# Patient Record
Sex: FEMALE | Race: WHITE | NOT HISPANIC OR LATINO | URBAN - METROPOLITAN AREA
[De-identification: names, ages, dates, MRNs, and addresses within clinical notes are randomized per-mention and may not be internally consistent; named-entity substitution may affect disease eponyms.]

---

## 2018-01-08 ENCOUNTER — OFFICE VISIT - HEALTHEAST (OUTPATIENT)
Dept: FAMILY MEDICINE | Facility: CLINIC | Age: 58
End: 2018-01-08

## 2018-01-08 ENCOUNTER — COMMUNICATION - HEALTHEAST (OUTPATIENT)
Dept: TELEHEALTH | Facility: CLINIC | Age: 58
End: 2018-01-08

## 2018-01-08 DIAGNOSIS — K64.4 EXTERNAL HEMORRHOIDS: ICD-10-CM

## 2018-01-08 DIAGNOSIS — Z13.1 SCREENING FOR DIABETES MELLITUS: ICD-10-CM

## 2018-01-08 DIAGNOSIS — I10 ESSENTIAL HYPERTENSION: ICD-10-CM

## 2018-01-08 DIAGNOSIS — Z00.00 ROUTINE GENERAL MEDICAL EXAMINATION AT A HEALTH CARE FACILITY: ICD-10-CM

## 2018-01-08 DIAGNOSIS — Z13.220 SCREENING FOR LIPID DISORDERS: ICD-10-CM

## 2018-01-08 DIAGNOSIS — Z12.4 SCREENING FOR CERVICAL CANCER: ICD-10-CM

## 2018-01-08 LAB
CHOLEST SERPL-MCNC: 239 MG/DL
FASTING STATUS PATIENT QL REPORTED: YES
FASTING STATUS PATIENT QL REPORTED: YES
GLUCOSE BLD-MCNC: 93 MG/DL (ref 70–125)
HDLC SERPL-MCNC: 50 MG/DL
LDLC SERPL CALC-MCNC: 175 MG/DL
TRIGL SERPL-MCNC: 70 MG/DL

## 2018-01-08 RX ORDER — MULTIVIT-MIN/IRON FUM/FOLIC AC 7.5 MG-4
TABLET ORAL
Status: SHIPPED | COMMUNITY
Start: 2018-01-08

## 2018-01-08 ASSESSMENT — MIFFLIN-ST. JEOR: SCORE: 1447.45

## 2018-01-08 NOTE — ASSESSMENT & PLAN NOTE
The patient was diagnosed with hypertension based on one office visit and prescribed 10 mg of amlodipine several years ago.  She never started the medication and has been checking her blood pressure.  She says in general is between the 1 teens in the 130s.  She is a strong preference not to be on antihypertensive medications.  She is in an acceptable range.  We did discuss that the recommendations are in flux and may be changing and that I might recommend an antihypertensive medication next year if she continues to be in the similar range.

## 2018-01-08 NOTE — ASSESSMENT & PLAN NOTE
57-year-old woman establishing care with wellness exam today.  No specific complaints.  -It has been apparently 7 years since her most recent lipid profile.  Check fasting lipids today.  -The patient is overweight.  Screen for diabetes.  -Pap smear performed today.  Anticipate normalresult.  (Status post partial hysterectomy with intact cervix).  -Reviewed colonoscopy history.  She is due in 2021.  We have requested records.  -Mammography ordered

## 2018-01-09 LAB
HPV SOURCE: NORMAL
HUMAN PAPILLOMA VIRUS 16 DNA: NEGATIVE
HUMAN PAPILLOMA VIRUS 18 DNA: NEGATIVE
HUMAN PAPILLOMA VIRUS FINAL DIAGNOSIS: NORMAL
HUMAN PAPILLOMA VIRUS OTHER HR: NEGATIVE
SPECIMEN DESCRIPTION: NORMAL

## 2018-01-11 LAB
BKR LAB AP ABNORMAL BLEEDING: NO
BKR LAB AP BIRTH CONTROL/HORMONES: NORMAL
BKR LAB AP CERVICAL APPEARANCE: NORMAL
BKR LAB AP GYN ADEQUACY: NORMAL
BKR LAB AP GYN INTERPRETATION: NORMAL
BKR LAB AP HPV REFLEX: NORMAL
BKR LAB AP LMP: NORMAL
BKR LAB AP PATIENT STATUS: NORMAL
BKR LAB AP PREVIOUS ABNORMAL: 1987
BKR LAB AP PREVIOUS NORMAL: 2013
HIGH RISK?: NO
PATH REPORT.COMMENTS IMP SPEC: NORMAL
RESULT FLAG (HE HISTORICAL CONVERSION): NORMAL

## 2018-01-17 ENCOUNTER — COMMUNICATION - HEALTHEAST (OUTPATIENT)
Dept: FAMILY MEDICINE | Facility: CLINIC | Age: 58
End: 2018-01-17

## 2018-01-17 DIAGNOSIS — Z91.89 AT HIGH RISK FOR BREAST CANCER: ICD-10-CM

## 2018-01-17 DIAGNOSIS — Z12.31 VISIT FOR SCREENING MAMMOGRAM: ICD-10-CM

## 2018-01-17 DIAGNOSIS — Z12.39 SCREENING FOR BREAST CANCER: ICD-10-CM

## 2018-02-08 ENCOUNTER — HOSPITAL ENCOUNTER (OUTPATIENT)
Dept: MAMMOGRAPHY | Facility: CLINIC | Age: 58
Discharge: HOME | End: 2018-02-08
Attending: FAMILY MEDICINE

## 2018-02-08 DIAGNOSIS — Z12.31 VISIT FOR SCREENING MAMMOGRAM: ICD-10-CM

## 2018-02-08 DIAGNOSIS — Z91.89 AT HIGH RISK FOR BREAST CANCER: ICD-10-CM

## 2018-02-08 DIAGNOSIS — Z12.39 SCREENING FOR BREAST CANCER: ICD-10-CM

## 2021-05-31 VITALS — BODY MASS INDEX: 29.51 KG/M2 | WEIGHT: 188 LBS | HEIGHT: 67 IN

## 2021-06-15 NOTE — PROGRESS NOTES
Assessment/Plan:      Healthy female exam.      Routine general medical examination at a health care facility  57-year-old woman establishing care with wellness exam today.  No specific complaints.  -It has been apparently 7 years since her most recent lipid profile.  Check fasting lipids today.  -The patient is overweight.  Screen for diabetes.  -Pap smear performed today.  Anticipate normal result.  (Status post partial hysterectomy with intact cervix).  -Reviewed colonoscopy history.  She is due in 2021.  We have requested records.  -Mammography ordered    Essential hypertension  The patient was diagnosed with hypertension based on one office visit and prescribed 10 mg of amlodipine several years ago.  She never started the medication and has been checking her blood pressure.  She says in general is between the 1 teens in the 130s.  She is a strong preference not to be on antihypertensive medications.  She is in an acceptable range.  We did discuss that the recommendations are in flux and may be changing and that I might recommend an antihypertensive medication next year if she continues to be in the similar range.    Subjective:      Miryam Botello is a 57 y.o. female who presents for an annual exam. The patient is sexually active. The patient participates in regular exercise: yes. The patient reports that there is not domestic violence in her life.     Healthy Habits:   Regular Exercise: Yes, kettle bells  Sunscreen Use: Yes  Healthy Diet: Yes  Dental Visits Regularly: Yes  Seat Belt: Yes  Sexually active: Yes  Self Breast Exam Monthly:Yes  Hemoccults: N/A  Flex Sig: N/A  Colonoscopy: Yes  Lipid Profile: Yes  Glucose Screen: Yes  Prevention of Osteoporosis: No  Last Dexa: N/A  Guns at Home:  No      Immunization History   Administered Date(s) Administered     Tdap 01/13/2009     Immunization status: up to date and documented.    No exam data present    Gynecologic History  No LMP recorded. Patient has had a  hysterectomy.  Contraception: none  Last Pap: 2013. Results were: normal  Last mammogram: 2015. Results were: normal      OB History   No data available       Current Outpatient Prescriptions   Medication Sig Dispense Refill     cholecalciferol, vitamin D3, 1,000 unit tablet 5,000 Int'l Units.       magnesium sulfate 100 mg cap 235 mg.       multivitamin with minerals tablet        NON FORMULARY probodic- 1 tablet daily       No current facility-administered medications for this visit.      History reviewed. No pertinent past medical history.  Past Surgical History:   Procedure Laterality Date     BUNIONECTOMY      2000 left foot      HYSTERECTOMY      pt still has cervix      WISDOM TOOTH EXTRACTION       Codeine  Family History   Problem Relation Age of Onset     Stroke Mother      Obesity Mother      Cancer Mother      lung     Hypertension Mother      Rheum arthritis Father      Hypertension Father      Diabetes Father      Cancer Sister      lung cancer      No Medical Problems Brother      Stroke Maternal Grandmother      Emphysema Maternal Grandmother      Cancer Paternal Grandmother      lymphoma      Prostate cancer Paternal Grandfather      Colon cancer Neg Hx      Social History     Social History     Marital status:      Spouse name: N/A     Number of children: N/A     Years of education: N/A     Occupational History     Not on file.     Social History Main Topics     Smoking status: Never Smoker     Smokeless tobacco: Never Used     Alcohol use Yes      Comment: 4 glasses of wine      Drug use: No     Sexual activity: Yes     Other Topics Concern     Not on file     Social History Narrative     No narrative on file       Review of Systems  Review of Systems   Constitutional: Negative.    HENT: Negative.    Eyes: Negative.    Respiratory: Negative.    Cardiovascular: Negative.    Gastrointestinal: Negative.    Endocrine: Negative.    Genitourinary: Negative.    Musculoskeletal: Negative.    Skin:  "Negative.    Neurological: Negative.    Hematological: Negative.    Psychiatric/Behavioral: Negative.              Objective:         Vitals:    01/08/18 0945   BP: 136/86   Pulse: 86   Resp: 20   Temp: 98.3  F (36.8  C)   TempSrc: Oral   SpO2: 98%   Weight: 188 lb (85.3 kg)   Height: 5' 6.5\" (1.689 m)     Body mass index is 29.89 kg/(m^2).    Physical  Physical Exam   Constitutional: She is oriented to person, place, and time and well-developed, well-nourished, and in no distress. No distress.   HENT:   Head: Normocephalic.   Right Ear: External ear normal.   Left Ear: External ear normal.   Mouth/Throat: Oropharynx is clear and moist. No oropharyngeal exudate.   Eyes: Conjunctivae and EOM are normal. Pupils are equal, round, and reactive to light. Left eye exhibits no discharge. No scleral icterus.   Neck: Normal range of motion. No thyromegaly present.   Cardiovascular: Normal rate, regular rhythm and intact distal pulses.  Exam reveals no gallop and no friction rub.    No murmur heard.  Pulmonary/Chest: Effort normal and breath sounds normal. No respiratory distress.   Abdominal: Soft. She exhibits no distension and no mass. There is no tenderness.   Genitourinary: Rectal exam shows external hemorrhoid. There is no rash, tenderness or lesion on the right labia. There is no rash, tenderness or lesion on the left labia. Cervix exhibits no motion tenderness, no discharge and no friability. No bleeding in the vagina. No vaginal discharge found.   Musculoskeletal: Normal range of motion. She exhibits no edema.   Lymphadenopathy:     She has no cervical adenopathy.   Neurological: She is alert and oriented to person, place, and time. She displays normal reflexes. No cranial nerve deficit.   Skin: Skin is warm and dry.   Psychiatric: Mood, memory, affect and judgment normal.   Nursing note and vitals reviewed.         "

## 2021-06-16 PROBLEM — K64.4 EXTERNAL HEMORRHOIDS: Status: ACTIVE | Noted: 2018-01-09

## 2021-06-16 PROBLEM — Z00.00 ROUTINE GENERAL MEDICAL EXAMINATION AT A HEALTH CARE FACILITY: Status: ACTIVE | Noted: 2018-01-08

## 2021-06-26 ENCOUNTER — HEALTH MAINTENANCE LETTER (OUTPATIENT)
Age: 61
End: 2021-06-26

## 2021-10-16 ENCOUNTER — HEALTH MAINTENANCE LETTER (OUTPATIENT)
Age: 61
End: 2021-10-16

## 2022-07-23 ENCOUNTER — HEALTH MAINTENANCE LETTER (OUTPATIENT)
Age: 62
End: 2022-07-23

## 2022-10-01 ENCOUNTER — HEALTH MAINTENANCE LETTER (OUTPATIENT)
Age: 62
End: 2022-10-01

## 2023-08-06 ENCOUNTER — HEALTH MAINTENANCE LETTER (OUTPATIENT)
Age: 63
End: 2023-08-06